# Patient Record
Sex: MALE | Race: WHITE
[De-identification: names, ages, dates, MRNs, and addresses within clinical notes are randomized per-mention and may not be internally consistent; named-entity substitution may affect disease eponyms.]

---

## 2018-10-14 ENCOUNTER — HOSPITAL ENCOUNTER (EMERGENCY)
Dept: HOSPITAL 36 - ER | Age: 38
LOS: 1 days | Discharge: HOME | End: 2018-10-15
Payer: MEDICAID

## 2018-10-14 DIAGNOSIS — R06.4: ICD-10-CM

## 2018-10-14 DIAGNOSIS — F41.9: Primary | ICD-10-CM

## 2018-10-14 DIAGNOSIS — R06.82: ICD-10-CM

## 2018-10-14 LAB
ALBUMIN SERPL-MCNC: 3.9 GM/DL (ref 4.2–5.5)
ALBUMIN/GLOB SERPL: 1.3 {RATIO} (ref 1–1.8)
ALP SERPL-CCNC: 68 U/L (ref 34–104)
ALT SERPL-CCNC: 39 U/L (ref 7–52)
ANION GAP SERPL CALC-SCNC: 12.7 MMOL/L (ref 7–16)
AST SERPL-CCNC: 30 U/L (ref 13–39)
BASOPHILS # BLD AUTO: 0.1 TH/CUMM (ref 0–0.2)
BASOPHILS NFR BLD AUTO: 0.9 % (ref 0–2)
BILIRUB SERPL-MCNC: 0.3 MG/DL (ref 0.3–1)
BUN SERPL-MCNC: 14 MG/DL (ref 7–25)
CALCIUM SERPL-MCNC: 8.4 MG/DL (ref 8.6–10.3)
CHLORIDE SERPL-SCNC: 107 MEQ/L (ref 98–107)
CO2 SERPL-SCNC: 22 MEQ/L (ref 21–31)
CREAT SERPL-MCNC: 0.8 MG/DL (ref 0.7–1.3)
EOSINOPHIL # BLD AUTO: 0.2 TH/CMM (ref 0.1–0.4)
EOSINOPHIL NFR BLD AUTO: 2.6 % (ref 0–5)
ERYTHROCYTE [DISTWIDTH] IN BLOOD BY AUTOMATED COUNT: 12.4 % (ref 11.5–20)
GLOBULIN SER-MCNC: 2.9 GM/DL
GLUCOSE SERPL-MCNC: 96 MG/DL (ref 70–105)
HCT VFR BLD CALC: 41.7 % (ref 41–60)
HGB BLD-MCNC: 14.5 GM/DL (ref 12–16)
LYMPHOCYTE AB SER FC-ACNC: 2.9 TH/CMM (ref 1.5–3)
LYMPHOCYTES NFR BLD AUTO: 41.4 % (ref 20–50)
MCH RBC QN AUTO: 31.5 PG (ref 26–30)
MCHC RBC AUTO-ENTMCNC: 34.7 PG (ref 28–36)
MCV RBC AUTO: 90.8 FL (ref 80–99)
MONOCYTES # BLD AUTO: 0.3 TH/CMM (ref 0.3–1)
MONOCYTES NFR BLD AUTO: 3.8 % (ref 2–10)
NEUTROPHILS # BLD: 3.4 TH/CMM (ref 1.8–8)
NEUTROPHILS NFR BLD AUTO: 51.3 % (ref 40–80)
PLATELET # BLD: 227 TH/CMM (ref 150–400)
PMV BLD AUTO: 8.7 FL
POTASSIUM SERPL-SCNC: 3.7 MEQ/L (ref 3.5–5.1)
RBC # BLD AUTO: 4.59 MIL/CMM (ref 4.3–5.7)
SODIUM SERPL-SCNC: 138 MEQ/L (ref 136–145)
WBC # BLD AUTO: 6.9 TH/CMM (ref 4.8–10.8)

## 2018-10-14 PROCEDURE — 36415 COLL VENOUS BLD VENIPUNCTURE: CPT

## 2018-10-14 PROCEDURE — 99284 EMERGENCY DEPT VISIT MOD MDM: CPT

## 2018-10-14 PROCEDURE — 85025 COMPLETE CBC W/AUTO DIFF WBC: CPT

## 2018-10-14 PROCEDURE — Z7502: HCPCS

## 2018-10-14 PROCEDURE — 96374 THER/PROPH/DIAG INJ IV PUSH: CPT

## 2018-10-14 PROCEDURE — 80053 COMPREHEN METABOLIC PANEL: CPT

## 2018-10-14 NOTE — ED PHYSICIAN CHART
ED Chief Complaint/HPI





- Patient Information


Date Seen:: 10/14/18


Time Seen:: 21:56


Chief Complaint:: ANXIETY TACHYPNEA


History of Present Illness:: 





ANXIETY TACHYPNEA





ED Review of Systems





- Review of Systems


General/Constitutional: No fever, No chills, No weight loss, No weakness, No 

diaphoresis, No edema, No loss of appetite


Skin: No skin lesions, No rash, No bruising


Head: No headache, No light-headedness


Eyes: No loss of vision, No pain, No diplopia


ENT: No earache, No nasal drainage, No sore throat, No tinnitus


Neck: No neck pain, No swelling, No thyromegaly, No stiffness, No mass noted


Cardio Vascular: No chest pain, No palpitations, No PND, No orthopnea, No edema


Pulmonary: No SOB, No cough, No sputum, No wheezing


GI: No nausea, No vomiting, No diarrhea, No pain, No melena, No hematochezia, 

No constipation, No hematemesis


G/U: No dysuria, No frequency, No hematuria


Musculoskeletal: No bone or joint pain, No back pain, No muscle pain


Endocrine: No polyuria, No polydipsia


Psychiatric: No prior psych history, No depression, No anxiety, No suicidal 

ideation


Hematopoietic: No bruising, No lymphadenopathy


Allergic/Immuno: No urticaria, No angioedema


Neurological: No syncope, No focal symptoms, No weakness, No paresthesia, No 

headache, No seizure, No dizziness, No confusion, No vertigo





ED Past Medical History





- Past Medical History


Past Medical History: No significant medical hx





ED Physical Exam





- Physical Examination


General/Constitutional: Well-developed, well-nourished


Other Gen/Cons comments:: 





TACHYPNEA 


Head: Atraumatic


Eyes: Lids, conjuctiva normal, PERRL, EOMI


Skin: Nl inspection, No rash, No skin lesions, No ecchymosis, Well hydrated, No 

lymphadenopathy


ENMT: External ears, nose nl, Nasal exam nl, Lips, teeth, gums nl


Neck: Nontender, Full ROM w/o pain, No JVD, No nuchal rigidity, No bruit, No 

mass, No stridor


Respiratory: Nl effort/Exclusion, Clear to Auscultation, No Wheeze/Rhonchi/Rales


Cardio Vascular: RRR, No murmur, gallop, rubs, NL S1 S2


GI: No tenderness/rebounding/guarding, No organomegaly, No hernia, Normal BS's, 

Nondistended, No mass/bruits, No McBurney tenderness


: No CVA tenderness


Extremities: No tenderness or effusion, Full ROM, normal strength in all 

extremities, No edema, Normal digits & nails


Neuro/Psych: Alert/oriented, DTR's symmetric, Normal sensory exam, Normal motor 

strength, Judgement/insight normal, Mood normal, Normal gait, No focal deficits


Misc: Normal back, No paraspinal tenderness





ED Assessment





- Assessment


General Assessment: 





AS BEFORE


Excludes all billable procedures: Yes





- Procedures


Informed Consent: Procedure/risk/benefits explained by MD: Yes


Laceration Type:: None





ED Septic Shock





- .


Is Septic Shock (SBP<90, OR Lactate>4 mmol\L) present?: No





ED Reassessment (Disposition)





- Reassessment


Reassessment Condition:: Improved





- Diagnosis


Diagnosis:: 





HYPERVENTILATION ANXIETY





- Aftercare/Follow up Instructions


Aftercare/Follow-Up Instructions:: Counseled pt regarding lab results/diagnosis 

& need follow up





- Patient Disposition


Discharge/Transfer:: Home


Condition at Disposition:: Stable